# Patient Record
Sex: FEMALE | Race: WHITE | URBAN - METROPOLITAN AREA
[De-identification: names, ages, dates, MRNs, and addresses within clinical notes are randomized per-mention and may not be internally consistent; named-entity substitution may affect disease eponyms.]

---

## 2018-10-08 DIAGNOSIS — J18.9 PNEUMONIA: Primary | ICD-10-CM

## 2020-06-23 ENCOUNTER — OFFICE VISIT - RIVER FALLS (OUTPATIENT)
Dept: FAMILY MEDICINE | Facility: CLINIC | Age: 85
End: 2020-06-23

## 2020-06-23 ENCOUNTER — COMMUNICATION - RIVER FALLS (OUTPATIENT)
Dept: FAMILY MEDICINE | Facility: CLINIC | Age: 85
End: 2020-06-23

## 2020-06-24 ENCOUNTER — COMMUNICATION - RIVER FALLS (OUTPATIENT)
Dept: FAMILY MEDICINE | Facility: CLINIC | Age: 85
End: 2020-06-24

## 2020-06-24 LAB
BUN SERPL-MCNC: 31 MG/DL (ref 7–25)
BUN/CREAT RATIO - HISTORICAL: 27 (ref 6–22)
CALCIUM SERPL-MCNC: 10.3 MG/DL (ref 8.6–10.4)
CHLORIDE BLD-SCNC: 98 MMOL/L (ref 98–110)
CO2 SERPL-SCNC: 30 MMOL/L (ref 20–32)
CREAT SERPL-MCNC: 1.13 MG/DL (ref 0.6–0.88)
EGFRCR SERPLBLD CKD-EPI 2021: 44 ML/MIN/1.73M2
GLUCOSE BLD-MCNC: 99 MG/DL (ref 65–99)
POTASSIUM BLD-SCNC: 4 MMOL/L (ref 3.5–5.3)
SODIUM SERPL-SCNC: 140 MMOL/L (ref 135–146)

## 2020-06-26 ENCOUNTER — OFFICE VISIT - RIVER FALLS (OUTPATIENT)
Dept: FAMILY MEDICINE | Facility: CLINIC | Age: 85
End: 2020-06-26

## 2021-05-26 ENCOUNTER — RECORDS - HEALTHEAST (OUTPATIENT)
Dept: ADMINISTRATIVE | Facility: CLINIC | Age: 86
End: 2021-05-26

## 2021-05-27 ENCOUNTER — RECORDS - HEALTHEAST (OUTPATIENT)
Dept: ADMINISTRATIVE | Facility: CLINIC | Age: 86
End: 2021-05-27

## 2021-05-28 ENCOUNTER — RECORDS - HEALTHEAST (OUTPATIENT)
Dept: ADMINISTRATIVE | Facility: CLINIC | Age: 86
End: 2021-05-28

## 2021-06-03 ENCOUNTER — RECORDS - HEALTHEAST (OUTPATIENT)
Dept: ADMINISTRATIVE | Facility: CLINIC | Age: 86
End: 2021-06-03

## 2022-02-12 VITALS — HEART RATE: 69 BPM | SYSTOLIC BLOOD PRESSURE: 134 MMHG | TEMPERATURE: 98.1 F | DIASTOLIC BLOOD PRESSURE: 64 MMHG

## 2022-02-16 NOTE — PROGRESS NOTES
Patient:   LUIS CARLSON            MRN: [id number]            FIN: [id number]               Age:   87 years     Sex:  Female     :  1933   Associated Diagnoses:   Chronic diastolic (congestive) heart failure; Chronic obstructive pulmonary disease (COPD)   Author:   Ranjith Cervantes MD      Visit Information      Date of Service: 2020 02:20 pm  Performing Location: [facility]  Encounter#: [id number]      Primary Care Provider (PCP):  Ranjith Cervantes MD    NPI# [id number]      Referring Provider:  Ranjith Cervantes MD, NPI# [id number]      Chief Complaint   2020 2:31 PM CDT    Patient presents for hospital stay follow up for COPD. COVID-19 swab was negative per pt.      History of Present Illness   Patient is here for post hospital check.  She was hospitalized at [facility] with a flare of her COPD and heart failure.  Patient had been living independently.  She is single never  has no children.  She has some siblings were alive in the area.  [facility] she is just about done with her burst and taper of prednisone they put her on.  Continues on her diuretics.  She feels much better.         Review of Systems   Constitutional:  Negative except as documented in history of present illness.    Ear/Nose/Mouth/Throat:  Negative.    Respiratory:  Negative.    Cardiovascular:  Negative.    Gastrointestinal:  Negative.    Genitourinary:  Negative.    Musculoskeletal:  Negative.    Integumentary:  Negative.    Neurologic:  Negative.       Health Status   Allergies:    Allergic Reactions (Selected)  Severe  Amoxicillin (Rash)   Medications:  (Selected)   Documented Medications  Documented  Aplisol 5 tuberculin units/0.1 mL intradermal solution: = 0.1 mL ( 5 units ), ID, once, 0 Refill(s), Type: Maintenance  Calcium 600+D: See Instructions, Instructions: 1 tab po daily, 0 Refill(s), Type: Maintenance  Coumadin 3 mg oral tablet: = 1 tab(s) ( 3 mg ),  Oral, daily, 0 Refill(s), Type: Maintenance  Lasix 20 mg oral tablet: = 1 tab(s) ( 20 mg ), Oral, daily, 0 Refill(s), Type: Maintenance  acetaminophen 325 mg oral tablet: = 2 tab(s) ( 650 mg ), Oral, q4 hrs, PRN: for pain, # 120 tab(s), 0 Refill(s), Type: Maintenance  albuterol 2.5 mg/3 mL (0.083%) inhalation solution: = 3 mL ( 2.5 mg ), NEB, q6 hrs, 0 Refill(s), Type: Maintenance  doxycycline monohydrate 100 mg oral capsule: = 1 cap(s) ( 100 mg ), Oral, bid, 0 Refill(s), Type: Maintenance  latanoprost 0.005% ophthalmic emulsion: 1 drop(s), qpm, 0 Refill(s), Type: Maintenance  metoprolol extended release: ( 12.5 mg ), Oral, daily, 0 Refill(s), Type: Maintenance  multivitamin with minerals (w/ Iron): 0 Refill(s), Type: Maintenance  omeprazole 40 mg oral delayed release capsule: = 1 cap(s) ( 40 mg ), Oral, bid, # 90 cap(s), 0 Refill(s), Type: Maintenance  predniSONE 20 mg oral tablet: = 2 tab(s) ( 40 mg ), Oral, daily, Instructions: for 2 days, 0 Refill(s), Type: Maintenance  senna 8.6 mg oral tablet: = 1 tab(s) ( 8.6 mg ), Oral, hs, PRN: for constipation, # 20 tab(s), 0 Refill(s), Type: Maintenance   Problem list:    All Problems  AF (atrial fibrillation) / SNOMED CT 04005299 / Confirmed  Cardiac pacemaker / SNOMED CT 9637229531 / Confirmed  Chronic diastolic (congestive) heart failure / SNOMED CT 8168400095 / Confirmed  Chronic obstructive pulmonary disease (COPD) / SNOMED CT 80460743 / Confirmed  History of atrioventricular arline ablation / SNOMED CT 4937763741 / Confirmed  Breast cancer / SNOMED CT 148649579 / Confirmed  Knee osteoarthritis / SNOMED CT 760982161 / Confirmed      Histories   Past Medical History:    No active or resolved past medical history items have been selected or recorded.   Family History:    No family history items have been selected or recorded.   Procedure history:    No active procedure history items have been selected or recorded.   Social History:             No active social history  items have been recorded.      Physical Examination   Vital Signs   6/23/2020 2:31 PM CDT Temperature Tympanic 98.1 DegF    Peripheral Pulse Rate 69 bpm    HR Method Electronic    Systolic Blood Pressure 134 mmHg    Diastolic Blood Pressure 64 mmHg    Mean Arterial Pressure 87 mmHg    BP Method Electronic      Neck:  Supple, Non-tender.    Respiratory:  Lungs are clear to auscultation, Respirations are non-labored.    Cardiovascular:  Normal rate, Regular rhythm, No edema.    Gastrointestinal:  Soft, Non-tender.    Neurologic:  Alert, Oriented.       Impression and Plan   Diagnosis     Chronic diastolic (congestive) heart failure (RPR18-WF I50.32).     Chronic obstructive pulmonary disease (COPD) (YBB51-[place] J44.9).     Course:  Progressing as expected.    Plan:  Overall patient seems to be recovering from her hospitalization well.  I told her that she might need additional prednisone depending how she does if she comes off it per her orders from the hospital.  We are due for labs today with her diuretic use.  We will make no changes otherwise.  .    Patient Instructions:       Counseled: Patient, Regarding diagnosis, Regarding treatment, Regarding medications.    med rec nd dc summary reviewed

## 2022-02-16 NOTE — NURSING NOTE
Anticoagulation Therapy Management Entered On:  6/26/2020 4:04 PM CDT    Performed On:  6/26/2020 4:02 PM CDT by Bernarda Shetty RN               Anticoagulation Visit Assessment   Anticoagulation Indication :   Atrial fibrillation   Anticoagulation Medication Verified :   Yes   Bernarda Shetty RN - 6/26/2020 4:02 PM CDT   Anticoagulation Patient Assessment Grid   Change in Alcohol Consumption :   No   Change in Diet :   No   Change in Medications :   No   Diarrhea :   No   Rectal Bleeding :   No   Signs of Clotting :   No   Signs of Warfarin Intolerance :   No   Unusual Bleeding, Bruising :   No   Upcoming Procedures :   No   Vomiting :   No   Bernarda Shetty RN - 6/26/2020 4:02 PM CDT   Patient on Warfarin :   Yes   Bernarda Shetty RN - 6/26/2020 4:02 PM CDT   Warfarin   Anticoagulant INR Goal Lower :   2    Anticoagulant INR Goal Upper :   3    INR Home Monitoring Result :   2.4    Sunday :   1.5 mg   Monday :   3 mg   Tuesday :   1.5 mg   Wednesday :   3 mg   Thursday :   1.5 mg   Friday :   3 mg   Saturday :   1.5 mg   Total Dose :   15 mg   Warfarin Pt Reported Previous Week Dose :    Sun Mon Tues Wed Thurs Fri Sat Weekly Total Dose   Week 1                   Week 2                   Week 3                   Week 4                         Patient is taking single or multiple strength tablet(s) :   Single strength tab(s)   One Tab Strength :   3 mg tab   Sunday :   1.5 mg   Monday :   3 mg   Tuesday :   1.5 mg   Wednesday :   3 mg   Thursday :   1.5 mg   Friday :   3 mg   Saturday :   1.5 mg   Week 1 Total Dose :   15 mg   Sunday :   0.5 tab(s)   Monday :   1 tab(s)   Tuesday :   0.5 tab(s)   Wednesday :   1 tab(s)   Thursday :   0.5 tab(s)   Friday :   1 tab(s)   Saturday :   0.5 tab(s)   Patient Instructions :   Richard INR = 2.4; per protocol continue warfarin 3 mg M/W/F and 1.5 mg ROW; recheck INR in 1 week; faxed to Bernarda Hill RN - 6/26/2020 4:02 PM CDT

## 2022-02-16 NOTE — TELEPHONE ENCOUNTER
---------------------  From: Bernarda Shetty RN   Sent: 8/7/2020 8:59:49 AM CDT  Subject: Discharged from [facility]     [facility] on 7/7/2020

## 2022-02-16 NOTE — TELEPHONE ENCOUNTER
---------------------  From: Kenya Orosco RN (Phone Messages Pool ([address] [place] [address]))   To: [facility] Message Pool ([address] [place] [address]);     Sent: 6/23/2020 4:43:31 PM CDT  Subject: General Message     Phone Message    PCP:   ROD listed - asked for [facility]      Time of Call:  1614       Person Calling:  Celeste - RN at [facility]  Phone number:  [phone]    Returned call at: _    Note:   Celeste called regarding patient's Prednisone 40mg. She states pt started this last Tuesday. She is wondering if patient is to continue this, will be tapering off, or if she is to stop it completely. Please advise.    Last office visit and reason:  6-23-20---------------------  From: Yary Benton LPN ([facility] Message Pool ([address] [place] [address]))   To: [facility] Message Pool ([address] [place] [address]);     Sent: 6/23/2020 4:50:35 PM CDT  Subject: FW: General Message     Patient is not a [facility] patient, has not seen this patient, saw TFS today.---------------------  From: Mar Olmstead ([facility]FS Message Pool ([address] [place] [address]))   To: Ranjith Cervantes MD;     Sent: 6/23/2020 5:02:41 PM CDT  Subject: FW: General Message---------------------  From: Ranjith Cervantes MD   To: [facility] Message Pool ([address] [place] [address]);     Sent: 6/23/2020 5:10:01 PM CDT  Subject: RE: General Message     will be stopping itSpoke with nurse and notified of recommendation.

## 2022-02-16 NOTE — TELEPHONE ENCOUNTER
---------------------  From: Ranjith Cervantes MD   To: [employer] Message Pool ([id number]);     Sent: 6/26/2020 4:38:48 PM CDT  Subject: General Message     Please have her start potassium chloride 20 meq daily dsp 90 ref 3 repeat bmp in 1 week and 1 monthcalled number in chart, no answer and no voicemail.Called @ 1420 w/ no answer or vm available.Spoke with Tono nurse at [facility] and informed him that medication will [facility] per their request   and that pt will need BMP in 1 week after starting medication and then again in 1 month, he verbalized a good understanding.

## 2022-02-16 NOTE — LETTER
(Inserted Image. Unable to display)         July 28, 2020        LUIS CARLSON  [address], [place] [id number]        Dear LUIS,    Thank you for selecting [facility] for your healthcare needs.    Our records indicate you are due for the following services:     Follow-up office visit      To schedule an appointment or if you have further questions, please contact your primary clinic:   [facility]       [phone]   [facility]       [phone]              [facility]     [phone]      Powered by [employer]    Sincerely,    Ranjith Cervantes MD

## 2022-02-16 NOTE — TELEPHONE ENCOUNTER
---------------------  From: Bernarda Shetty RN   To: Ranjith Cervantes MD;     Cc: INR Pool ( [id number]);      Sent: 6/26/2020 1:38:43 PM CDT  Subject: INR goal     Please indicate therapeutic INR goal for pt with A fib.---------------------  From: Ranjith Cervantes MD   To: Bernarda Shetty RN;     Sent: 6/26/2020 3:43:43 PM CDT  Subject: RE: INR goal     2-3OK

## 2022-02-16 NOTE — LETTER
(Inserted Image. Unable to display)   June 24, 2020      LUIS CARLSON      [address]  [place] [id number]        Dear LUIS,    Thank you for selecting [facility] for your healthcare needs.  Below you will find the results of the recent tests done at our clinic.     All labs acceptable.      Result Name Current Result Reference Range   Potassium Level (mmol/L)  4.0 6/23/2020 3.5 - 5.3   Creatinine Level (mg/dL) ((H)) 1.13 6/23/2020 0.60 - 0.88       Please contact me or my assistant at [phone] if you have any questions.     Sincerely,        Ranjith Cervantes MD        What do your labs mean?  Below is a glossary of commonly ordered labs:  LDL   Bad Cholesterol   HDL   Good Cholesterol  AST/ALT   Liver Function   Cr/Creatinine   Kidney Function  Microalbumin   Kidney Function  BUN   Kidney Function  PSA   Prostate    TSH   Thyroid Hormone  HgbA1c   Diabetes Test   Hgb (Hemoglobin)   Red Blood Cells

## 2022-02-16 NOTE — NURSING NOTE
Comprehensive Intake Entered On:  6/23/2020 2:39 PM CDT    Performed On:  6/23/2020 2:31 PM CDT by Susanne Yanez CMA               Summary   Chief Complaint :   Patient presents for hospital stay follow up for COPD. COVID-19 swab was negative per pt.   Ht/Wt Measurement Refused by Patient? :   Yes   Systolic Blood Pressure :   134 mmHg   Diastolic Blood Pressure :   64 mmHg   Mean Arterial Pressure :   87 mmHg   Peripheral Pulse Rate :   69 bpm   BP Method :   Electronic   HR Method :   Electronic   Temperature Tympanic :   98.1 DegF(Converted to: 36.7 DegC)    Susanne Yanez CMA - 6/23/2020 2:31 PM CDT   Health Status   Allergies Verified? :   Yes   Medication History Verified? :   Yes   Pre-Visit Planning Status :   Completed   Tobacco Use? :   Former smoker   Hospitalized since last visit? :   Yes   Inpatient? :   Yes   ED? :   Yes   Related to Condition :   Other: COPD.   Date of Discharge :   6/22/2020 CDT   Follow-up visit date :   6/23/2020 CDT   Discharge/Transition Plan Provided? :   Yes   Med List Reconciled? :   Yes   Susanne Yanez CMA - 6/23/2020 2:31 PM CDT   Consents   Consent for Immunization Exchange :   Consent Granted   Consent for Immunizations to Providers :   Consent Granted   Susanne Yanez CMA - 6/23/2020 2:31 PM CDT   Problems   (As Of: 6/23/2020 2:39:23 PM CDT)   Meds / Allergies   (As Of: 6/23/2020 2:39:23 PM CDT)   Allergies (Active)   amoxicillin  Estimated Onset Date:   Unspecified ; Reactions:   Rash ; Created By:   Susanne Yanez CMA; Reaction Status:   Active ; Category:   Drug ; Substance:   amoxicillin ; Type:   Allergy ; Severity:   Severe ; Updated By:   Susanne Yanez CMA; Source:   Patient ; Reviewed Date:   6/23/2020 2:35 PM CDT        Medication List   (As Of: 6/23/2020 2:39:23 PM CDT)   Home Meds    acetaminophen  :   acetaminophen ; Status:   Documented ; Ordered As Mnemonic:   acetaminophen 325 mg oral tablet ; Simple Display Line:   650 mg, 2 tab(s), Oral, q4  hrs, PRN: for pain, 120 tab(s), 0 Refill(s) ; Catalog Code:   acetaminophen ; Order Dt/Tm:   6/23/2020 2:32:06 PM CDT          albuterol  :   albuterol ; Status:   Documented ; Ordered As Mnemonic:   albuterol 2.5 mg/3 mL (0.083%) inhalation solution ; Simple Display Line:   2.5 mg, 3 mL, NEB, q6 hrs, 0 Refill(s) ; Catalog Code:   albuterol ; Order Dt/Tm:   6/23/2020 2:32:42 PM CDT          calcium-vitamin D  :   calcium-vitamin D ; Status:   Documented ; Ordered As Mnemonic:   Calcium 600+D ; Simple Display Line:   See Instructions, 1 tab po daily, 0 Refill(s) ; Catalog Code:   calcium-vitamin D ; Order Dt/Tm:   6/23/2020 2:30:51 PM CDT          doxycycline  :   doxycycline ; Status:   Documented ; Ordered As Mnemonic:   doxycycline monohydrate 100 mg oral capsule ; Simple Display Line:   100 mg, 1 cap(s), Oral, bid, 0 Refill(s) ; Catalog Code:   doxycycline ; Order Dt/Tm:   6/23/2020 2:27:26 PM CDT          furosemide  :   furosemide ; Status:   Documented ; Ordered As Mnemonic:   Lasix 20 mg oral tablet ; Simple Display Line:   20 mg, 1 tab(s), Oral, daily, 0 Refill(s) ; Catalog Code:   furosemide ; Order Dt/Tm:   6/23/2020 2:28:50 PM CDT          latanoprost ophthalmic  :   latanoprost ophthalmic ; Status:   Documented ; Ordered As Mnemonic:   latanoprost 0.005% ophthalmic emulsion ; Simple Display Line:   1 drop(s), qpm, 0 Refill(s) ; Catalog Code:   latanoprost ophthalmic ; Order Dt/Tm:   6/23/2020 2:29:51 PM CDT          metoprolol  :   metoprolol ; Status:   Documented ; Ordered As Mnemonic:   metoprolol extended release ; Simple Display Line:   12.5 mg, Oral, daily, 0 Refill(s) ; Catalog Code:   metoprolol ; Order Dt/Tm:   6/23/2020 2:29:19 PM CDT          multivitamin with minerals  :   multivitamin with minerals ; Status:   Documented ; Ordered As Mnemonic:   multivitamin with minerals (w/ Iron) ; Simple Display Line:   0 Refill(s) ; Catalog Code:   multivitamin with minerals ; Order Dt/Tm:   6/23/2020  2:30:24 PM CDT          omeprazole  :   omeprazole ; Status:   Documented ; Ordered As Mnemonic:   omeprazole 40 mg oral delayed release capsule ; Simple Display Line:   40 mg, 1 cap(s), Oral, bid, 90 cap(s), 0 Refill(s) ; Catalog Code:   omeprazole ; Order Dt/Tm:   6/23/2020 2:26:18 PM CDT          predniSONE  :   predniSONE ; Status:   Documented ; Ordered As Mnemonic:   predniSONE 20 mg oral tablet ; Simple Display Line:   40 mg, 2 tab(s), Oral, daily, for 2 days, 0 Refill(s) ; Catalog Code:   predniSONE ; Order Dt/Tm:   6/23/2020 2:31:25 PM CDT          senna  :   senna ; Status:   Documented ; Ordered As Mnemonic:   senna 8.6 mg oral tablet ; Simple Display Line:   8.6 mg, 1 tab(s), Oral, hs, PRN: for constipation, 20 tab(s), 0 Refill(s) ; Catalog Code:   senna ; Order Dt/Tm:   6/23/2020 2:30:07 PM CDT          tuberculin purified protein derivative  :   tuberculin purified protein derivative ; Status:   Documented ; Ordered As Mnemonic:   Aplisol 5 tuberculin units/0.1 mL intradermal solution ; Simple Display Line:   5 units, 0.1 mL, ID, once, 0 Refill(s) ; Catalog Code:   tuberculin purified protein derivative ; Order Dt/Tm:   6/23/2020 2:28:12 PM CDT          warfarin  :   warfarin ; Status:   Documented ; Ordered As Mnemonic:   Coumadin 3 mg oral tablet ; Simple Display Line:   3 mg, 1 tab(s), Oral, daily, 0 Refill(s) ; Catalog Code:   warfarin ; Order Dt/Tm:   6/23/2020 2:24:29 PM CDT            ID Risk Screen   Recent Travel History :   No recent travel   Family Member Travel History :   No recent travel   Other Exposure to Infectious Disease :   Unknown   Susanne Yanez CMA - 6/23/2020 2:31 PM CDT